# Patient Record
Sex: FEMALE | Race: WHITE | NOT HISPANIC OR LATINO | Employment: UNEMPLOYED | RURAL
[De-identification: names, ages, dates, MRNs, and addresses within clinical notes are randomized per-mention and may not be internally consistent; named-entity substitution may affect disease eponyms.]

---

## 2021-07-16 ENCOUNTER — HOSPITAL ENCOUNTER (EMERGENCY)
Facility: HOSPITAL | Age: 36
Discharge: HOME | End: 2021-07-16
Payer: MEDICAID

## 2021-07-16 VITALS
RESPIRATION RATE: 14 BRPM | SYSTOLIC BLOOD PRESSURE: 141 MMHG | HEIGHT: 65 IN | DIASTOLIC BLOOD PRESSURE: 89 MMHG | TEMPERATURE: 98 F | BODY MASS INDEX: 29.99 KG/M2 | WEIGHT: 180 LBS | HEART RATE: 102 BPM | OXYGEN SATURATION: 100 %

## 2021-07-16 DIAGNOSIS — R60.9 SWELLING: ICD-10-CM

## 2021-07-16 DIAGNOSIS — R52 PAIN: ICD-10-CM
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
## 2021-07-16 DIAGNOSIS — S92.901A CLOSED FRACTURE OF RIGHT FOOT, INITIAL ENCOUNTER: Primary | ICD-10-CM

## 2021-07-16 PROCEDURE — 63600175 PHARM REV CODE 636 W HCPCS: Performed by: REGISTERED NURSE

## 2021-07-16 PROCEDURE — 99284 EMERGENCY DEPT VISIT MOD MDM: CPT | Performed by: REGISTERED NURSE

## 2021-07-16 PROCEDURE — 99283 PR EMERGENCY DEPT VISIT,LEVEL III: ICD-10-PCS | Mod: ,,, | Performed by: REGISTERED NURSE

## 2021-07-16 PROCEDURE — 99283 EMERGENCY DEPT VISIT LOW MDM: CPT | Mod: ,,, | Performed by: REGISTERED NURSE

## 2021-07-16 PROCEDURE — 96372 THER/PROPH/DIAG INJ SC/IM: CPT | Performed by: REGISTERED NURSE

## 2021-07-16 RX ORDER — KETOROLAC TROMETHAMINE 30 MG/ML
60 INJECTION, SOLUTION INTRAMUSCULAR; INTRAVENOUS
Status: COMPLETED | OUTPATIENT
Start: 2021-07-16 | End: 2021-07-16

## 2021-07-16 RX ADMIN — KETOROLAC TROMETHAMINE 60 MG: 30 INJECTION, SOLUTION INTRAMUSCULAR; INTRAVENOUS at 04:07

## 2023-06-12 ENCOUNTER — HOSPITAL ENCOUNTER (EMERGENCY)
Facility: HOSPITAL | Age: 38
Discharge: HOME | End: 2023-06-12
Attending: EMERGENCY MEDICINE
Payer: MEDICAID

## 2023-06-12 VITALS
SYSTOLIC BLOOD PRESSURE: 125 MMHG | RESPIRATION RATE: 18 BRPM | OXYGEN SATURATION: 97 % | HEIGHT: 65 IN | BODY MASS INDEX: 22.66 KG/M2 | DIASTOLIC BLOOD PRESSURE: 69 MMHG | WEIGHT: 136 LBS | HEART RATE: 77 BPM | TEMPERATURE: 98 F

## 2023-06-12 DIAGNOSIS — R07.9 CHEST PAIN: ICD-10-CM

## 2023-06-12 DIAGNOSIS — R07.89 CHEST DISCOMFORT: ICD-10-CM

## 2023-06-12 DIAGNOSIS — M94.0 COSTOCHONDRITIS: Primary | ICD-10-CM

## 2023-06-12 PROCEDURE — 99284 EMERGENCY DEPT VISIT MOD MDM: CPT | Mod: 25

## 2023-06-12 PROCEDURE — 93010 EKG 12-LEAD: ICD-10-PCS | Mod: ,,, | Performed by: INTERNAL MEDICINE

## 2023-06-12 PROCEDURE — 93005 ELECTROCARDIOGRAM TRACING: CPT

## 2023-06-12 PROCEDURE — 99284 EMERGENCY DEPT VISIT MOD MDM: CPT | Mod: ,,, | Performed by: EMERGENCY MEDICINE

## 2023-06-12 PROCEDURE — 93010 ELECTROCARDIOGRAM REPORT: CPT | Mod: ,,, | Performed by: INTERNAL MEDICINE

## 2023-06-12 PROCEDURE — 99284 PR EMERGENCY DEPT VISIT,LEVEL IV: ICD-10-PCS | Mod: ,,, | Performed by: EMERGENCY MEDICINE

## 2023-06-12 PROCEDURE — 25000003 PHARM REV CODE 250: Performed by: EMERGENCY MEDICINE

## 2023-06-12 RX ORDER — IBUPROFEN 600 MG/1
600 TABLET ORAL
Status: COMPLETED | OUTPATIENT
Start: 2023-06-12 | End: 2023-06-12

## 2023-06-12 RX ORDER — METRONIDAZOLE 500 MG/1
500 TABLET ORAL 2 TIMES DAILY
COMMUNITY
Start: 2023-02-13

## 2023-06-12 RX ADMIN — IBUPROFEN 600 MG: 600 TABLET, FILM COATED ORAL at 05:06

## 2023-06-12 NOTE — DISCHARGE INSTRUCTIONS
Your x-ray did not show any concerning findings of your lungs or your heart.  Please follow-up with Internal Medicine for outpatient workup if your symptoms persist  Meantime, take ibuprofen 600 mg as needed for pain.  Return at any time for concerning symptoms

## 2023-06-12 NOTE — ED TRIAGE NOTES
Pt complaining about chest pain for several months. She states that a couple days ago it was so bad that she blacked out when she stood up.

## 2023-06-12 NOTE — ED NOTES
Bed: [facility] 06  Expected date: 6/10/23  Expected time: 2:17 PM  Means of arrival:   Comments:

## 2023-06-12 NOTE — ED PROVIDER NOTES
Encounter Date: 6/12/2023       History     Chief Complaint   Patient presents with    Chest Pain     Chest pain on r upper chest for 5 or 6 months,      Madelyn Hebert is a 38-year-old female no significant past medical history presenting today for right-sided chest pain.  Symptoms have been ongoing for the past 5-6 months.  Pain is located in the right upper chest wall, radiates under the right axilla then to the left scapula.  It occurs multiple times a day.  It is described as pressure-like, not associated with shortness of breath.  Several days ago she got up from sitting and passed out when she was walking to the bathroom.  It was very brief.  She did not have palpitations or shortness of breath during that time.  She took Reema aspirin without significant relief.  She is not been evaluated for these symptoms.  Denies fevers or chills.    No hormone therapy, history tubal ligation, 4 pregnancies.    Review of patient's allergies indicates:  No Known Allergies  History reviewed. No pertinent past medical history.  Past Surgical History:   Procedure Laterality Date    DILATION AND CURETTAGE OF UTERUS       History reviewed. No pertinent family history.  Social History     Tobacco Use    Smoking status: Every Day     Packs/day: 0.50     Years: 10.00     Pack years: 5.00     Types: Cigarettes    Smokeless tobacco: Never   Substance Use Topics    Alcohol use: Yes     Comment: socially    Drug use: No     Comment: History of opiod use on suboxine 2yrs now sees Dr. Jana Field     Review of Systems   Respiratory:  Positive for chest tightness.    Cardiovascular:  Positive for chest pain.     Physical Exam     Initial Vitals [06/12/23 1618]   BP Pulse Resp Temp SpO2   125/69 77 18 98.3 °F (36.8 °C) 97 %      MAP       --         Physical Exam    Nursing note and vitals reviewed.  Constitutional: She appears well-developed and well-nourished. No distress.   HENT:   Mouth/Throat: Oropharynx is clear and moist.   Eyes:  Conjunctivae are normal. No scleral icterus.   Neck: No JVD present.   Cardiovascular:  Normal rate, regular rhythm and intact distal pulses.           Pulmonary/Chest: Breath sounds normal. No respiratory distress. She has no wheezes. She has no rales. She exhibits tenderness (Right upper lateral chest wall tenderness).   Abdominal: Abdomen is soft. There is no abdominal tenderness.   Musculoskeletal:         General: No edema.     Lymphadenopathy:     She has no cervical adenopathy.   Neurological: She is alert and oriented to person, place, and time. She has normal strength.   Skin: Skin is warm. No rash noted.       ED Course   Procedures  Labs Reviewed - No data to display    EKG Readings: (Independently Interpreted)   EKG:  Sinus rhythm at 71, incomplete right bundle-branch block, no ischemic changes     Imaging Results              X-Ray Chest AP Portable (Final result)  Result time 06/12/23 19:16:01      Final result by Woody Enrique MD (06/12/23 19:16:01)                   Impression:      1. No acute cardiopulmonary process.      Electronically signed by: Woody Enrique MD  Date:    06/12/2023  Time:    19:16               Narrative:    EXAMINATION:  XR CHEST AP PORTABLE    CLINICAL HISTORY:  Chest pain, unspecified    TECHNIQUE:  Single frontal view of the chest was performed.    COMPARISON:  None    FINDINGS:  The cardiomediastinal silhouette is not enlarged.  There is no pleural effusion.  The trachea is midline.  The lungs are symmetrically expanded bilaterally without evidence of acute parenchymal process. No large focal consolidation seen.  There is no pneumothorax.  The osseous structures are unremarkable.                                       Medications   ibuprofen tablet 600 mg (has no administration in time range)     Medical Decision Making:   History:   Old Medical Records: I decided to obtain old medical records.  Initial Assessment:   Urgent evaluation a 38-year-old female presenting  with recurrent right-sided chest pain x 6 months.  Vital signs stable  Well-appearing, no distress  Reproducible on exam  Differential Diagnosis:   Costochondritis, musculoskeletal chest pain, arrhythmia, lung mass.  Vital signs stable without tachycardia or hypoxia, low suspicion for PE ( no risk factors)  Also considered cardiac etiology however pain on the right side and very atypical for ACS ( also no risk factors)  Independently Interpreted Test(s):   I have ordered and independently interpreted X-rays - see prior notes.  Clinical Tests:   Radiological Study: Reviewed and Ordered  Medical Tests: Ordered and Reviewed  ED Management:  - EKG  - chest x-ray  - ibuprofen 600 mg p.o.           ED Course as of 06/12/23 2133 Mon Jun 12, 2023   1853 Chest x-ray reviewed and independently interpreted by me as no acute pneumothorax, mass.  No cardiomegaly [GM]      ED Course User Index  [GM] Maria Esther Archuleta MD          Discussed results with patient, recommend continue ibuprofen as needed for costochondritis.  Follow-up with PCP, referral placed.  Return to emergency department for any concerning symptom.       Clinical Impression:   Final diagnoses:  [R07.89] Chest discomfort  [R07.9] Chest pain  [M94.0] Costochondritis (Primary)               Maria Esther Archuleta MD  06/12/23 2134